# Patient Record
Sex: MALE | Race: AMERICAN INDIAN OR ALASKA NATIVE
[De-identification: names, ages, dates, MRNs, and addresses within clinical notes are randomized per-mention and may not be internally consistent; named-entity substitution may affect disease eponyms.]

---

## 2018-07-20 ENCOUNTER — HOSPITAL ENCOUNTER (EMERGENCY)
Dept: HOSPITAL 5 - ED | Age: 33
Discharge: HOME | End: 2018-07-20
Payer: COMMERCIAL

## 2018-07-20 VITALS — SYSTOLIC BLOOD PRESSURE: 134 MMHG | DIASTOLIC BLOOD PRESSURE: 88 MMHG
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
## 2018-07-20 DIAGNOSIS — R07.81: ICD-10-CM
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
## 2018-07-20 DIAGNOSIS — M25.512: ICD-10-CM

## 2018-07-20 DIAGNOSIS — M25.562: ICD-10-CM

## 2018-07-20 DIAGNOSIS — S70.312A: Primary | ICD-10-CM

## 2018-07-20 DIAGNOSIS — I10: ICD-10-CM

## 2018-07-20 NOTE — XRAY REPORT
FINAL REPORT



EXAM:  XR RIBS UNILAT 2V LT



HISTORY:  L side chest pain, fall 



TECHNIQUE:   Frontal and oblique views of the left ribs



PRIORS:  None.



FINDINGS:  

There is no evidence of acute displaced rib fracture. The ribs

are partly obscured by superimposed anatomy.



There is no pneumothorax, pulmonary consolidation to suggest

contusion, or pleural effusion in the visualized chest. 



IMPRESSION:  

No visible acute displaced rib fracture

## 2018-07-20 NOTE — XRAY REPORT
FINAL REPORT



EXAM:  XR TIBIA FIBULA 2V LT



HISTORY:  fall, swelling, bruising 



TECHNIQUE:  2 views of the left tibia and fibula



PRIORS:  Left knee 7/20/2018



FINDINGS:  

Nonspecific soft tissue ossification of the distal interosseous

membrane between the tibia and fibula. This may be developmental

variation or reflect old trauma. There is no radiographic

evidence of definite acute fracture or dislocation.  No evidence

of osseous lesion.  Joint spaces are maintained.  There is no

evidence of significant degenerative arthrosis.



IMPRESSION:  

No acute skeletal pathology

## 2018-07-20 NOTE — XRAY REPORT
FINAL REPORT



EXAM:  XR HUMERUS 2+V LT



HISTORY:  fall, swelling, bruising 



TECHNIQUE:  2 views of the left humerus



PRIORS:  None.



FINDINGS:  

There is no radiographic evidence of definite acute fracture or

dislocation.  No evidence of osseous lesion.  Joint spaces are

maintained.  There is no evidence of significant degenerative

arthrosis.



IMPRESSION:  

No acute skeletal pathology

## 2018-07-20 NOTE — XRAY REPORT
FINAL REPORT



EXAM:  XR KNEE 3V LT



HISTORY:  fall, bruising, swelling 



TECHNIQUE:  3 views of the left knee



PRIORS:  None.



FINDINGS:  

There is no radiographic evidence of definite acute fracture or

dislocation.  No definite misalignment.  No evidence of osseous

lesion.  Joint spaces are maintained.  No evidence of synovial

joint effusion.  There is no evidence of significant arthrosis. 



IMPRESSION:  

No acute skeletal pathology

## 2018-07-20 NOTE — EMERGENCY DEPARTMENT REPORT
HPI





- General


Chief Complaint: Fall


Time Seen by Provider: 07/20/18 15:09





- HPI


HPI: 








The patient is a 33-year-old male who presents for evaluation of left RIBS and 

lower shoulder pain at the traumatic fall.  The patient states that one day ago 

he fell through a hole in his roof and sustained trauma to the left leg and 

left RIBS.  He complains of moderate to severe throbbing pain to the inner left 

proximal leg and anterior lateral left lower ribs, worsened with movement of 

the legs or bending over of the thorax, improved with lying down and wrist.  He 

denies, injury to the head, headache, neck pain or stiffness, back pain, chest 

pain, dyspnea, abdominal pain, pain to the other extremities.








ED Past Medical Hx





- Past Medical History


Previous Medical History?: Yes


Hx Hypertension: Yes





- Surgical History


Past Surgical History?: Yes


Additional Surgical History: hernia repair





- Social History


Smoking Status: Never Smoker





- Medications


Home Medications: 


 Home Medications











 Medication  Instructions  Recorded  Confirmed  Last Taken  Type


 


Acetaminophen/Codeine [Tylenol #3] 1 tab PO Q6H PRN #14 tab 07/20/18  Unknown Rx


 


Ibuprofen [Motrin] 800 mg PO Q8HR PRN #15 tablet 07/20/18  Unknown Rx














ED Review of Systems


ROS: 


Stated complaint: (L) LEG PAIN/ RIB PAIN


Other details as noted in HPI








Constitutional: denies: fever


ENT: denies: throat or neck pain


Respiratory: denies: cough, shortness of breath


Cardiovascular: denies: chest pain


Endocrine: denies unexplained weight loss or gain


Gastrointestinal: denies: abdominal pain, nausea


Genitourinary: denies: dysuria


Musculoskeletal: reports leg pain and rib pain denies: leg swelling


Skin: denies: rash


Neurological: denies: headache


Hematological/Lymphatic: denies: easy bleeding or easy bruising  


Psych: denies sadness or hopelessness











Physical Exam





- Physical Exam


Vital Signs: 


 Vital Signs











  07/20/18





  14:43


 


Temperature 97.8 F


 


Pulse Rate 60


 


Respiratory 16





Rate 


 


Blood Pressure 134/88





[Left] 


 


O2 Sat by Pulse 100





Oximetry 











Physical Exam: 








General: well-nourished, well-developed, no acute distress


Head: Normocephalic, atraumatic


Eyes: normal sclera


ENT: Mucous membranes are pink and moist 


Neck: trachea midline, neck supple, No neck stiffness, no cervical adenopathy


Respiratory: Breath sounds equal bilaterally, no wheezing, rales, or rhonchi


Cardio: S1 and S2 present, no murmurs, rubs, gallops, capillary refill is brisk


Abdomen: Normoactive bowel sounds, soft abdomen, no rigidity, no tenderness in 

the abdomen whatsoever


Chest WALL/Back: No tenderness to palpation of the chest wall, left lateral 

lower rib tenderness present at the anterior axillary line, no bony crepitus 

present, no midline bony tenderness overlying the cervical, thoracic, lumbar 

spinous process, no spinous step-off or obvious deformity present, no flank 

tenderness or bruising


Musc: Large abrasion present to the left inner thigh, no active bleeding, leg 

compartments are soft and pliable bilaterally, no signs of compartment syndrome

, there is left anterior and posterior thigh tenderness to palpation, left knee 

tenderness to palpation, left anterior shin tenderness to palpation, sensation, 

motor function, and pulses in the distal left leg and tach, including patellar 

tendon extensor function, No pitting edema


Skin: No rash


Neuro: no facial drooping, normal speech


Psych: Normal affect














ED Course


 Vital Signs











  07/20/18





  14:43


 


Temperature 97.8 F


 


Pulse Rate 60


 


Respiratory 16





Rate 


 


Blood Pressure 134/88





[Left] 


 


O2 Sat by Pulse 100





Oximetry 














ED Medical Decision Making





- Medical Decision Making








The patient was seen and examined by myself.  The patient is placed on a 

cardiac monitor and continuous pulse ox. On initial evaluation, the patient was 

found to be in no distress. Evaluation orders were placed.  The patient was 

given pain medicine.  The patient declined tetanus immunization as she reports 

receiving tenderness shot within the past 5 years.  X-ray of the left ribs is 

negative for fracture or pneumothorax.  X-ray of the left femur, knee, and tib-

fib are unremarkable as well. The patient was reevaluated and reported that 

their symptoms were markedly improved.  The patient is stable for discharge 

with outpatient follow-up.  The patient is given follow-up and return 

instructions.  The patient expressed understanding and agreed with the plan.  

The patient is discharged in stable condition.





Critical care attestation.: 


If time is entered above; I have spent that time in minutes in the direct care 

of this critically ill patient, excluding procedure time.








ED Disposition


Clinical Impression: 


 Abrasion, left thigh, initial encounter, Acute pain of left thigh, Rib pain on 

left side





Left knee pain


Qualifiers:


 Chronicity: acute Qualified Code(s): M25.562 - Pain in left knee





Disposition: DC-01 TO HOME OR SELFCARE


Is pt being admited?: No


Does the pt Need Aspirin: No


Condition: Stable


Instructions:  Chest Pain (ED), Arthralgia (ED), Musculoskeletal Pain (ED)


Referrals: 


PRIMARY CARE,MD [Primary Care Provider] - 3-5 Days


[facility] [Outside] - 3-5 Days


Time of Disposition: 18:12

## 2019-03-11 ENCOUNTER — HOSPITAL ENCOUNTER (EMERGENCY)
Dept: HOSPITAL 5 - ED | Age: 34
LOS: 1 days | Discharge: LEFT BEFORE DISCHARGE | End: 2019-03-12
Payer: COMMERCIAL

## 2019-03-11 DIAGNOSIS — F17.200: ICD-10-CM

## 2019-03-11 DIAGNOSIS — R51: Primary | ICD-10-CM

## 2019-03-11 DIAGNOSIS — I10: ICD-10-CM
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
## 2019-03-11 DIAGNOSIS — R07.89: ICD-10-CM

## 2019-03-11 DIAGNOSIS — E66.9: ICD-10-CM
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
## 2019-03-11 LAB
ALBUMIN SERPL-MCNC: 3.9 G/DL (ref 3.9–5)
ALT SERPL-CCNC: 35 UNITS/L (ref 7–56)
BASOPHILS # (AUTO): 0.1 K/MM3 (ref 0–0.1)
BASOPHILS NFR BLD AUTO: 0.9 % (ref 0–1.8)
BILIRUB DIRECT SERPL-MCNC: < 0.2 MG/DL (ref 0–0.2)
BUN SERPL-MCNC: 12 MG/DL (ref 9–20)
BUN/CREAT SERPL: 12 %
CALCIUM SERPL-MCNC: 8.8 MG/DL (ref 8.4–10.2)
EOSINOPHIL # BLD AUTO: 0.3 K/MM3 (ref 0–0.4)
EOSINOPHIL NFR BLD AUTO: 4.2 % (ref 0–4.3)
HCT VFR BLD CALC: 43.7 % (ref 35.5–45.6)
HEMOLYSIS INDEX: 71
HGB BLD-MCNC: 15.1 GM/DL (ref 11.8–15.2)
LYMPHOCYTES # BLD AUTO: 3.5 K/MM3 (ref 1.2–5.4)
LYMPHOCYTES NFR BLD AUTO: 48.9 % (ref 13.4–35)
MCHC RBC AUTO-ENTMCNC: 35 % (ref 32–34)
MCV RBC AUTO: 96 FL (ref 84–94)
MONOCYTES # (AUTO): 0.7 K/MM3 (ref 0–0.8)
MONOCYTES % (AUTO): 10.4 % (ref 0–7.3)
PLATELET # BLD: 260 K/MM3 (ref 140–440)
RBC # BLD AUTO: 4.58 M/MM3 (ref 3.65–5.03)

## 2019-03-11 PROCEDURE — 80076 HEPATIC FUNCTION PANEL: CPT

## 2019-03-11 PROCEDURE — 93005 ELECTROCARDIOGRAM TRACING: CPT

## 2019-03-11 PROCEDURE — 96374 THER/PROPH/DIAG INJ IV PUSH: CPT

## 2019-03-11 PROCEDURE — 80048 BASIC METABOLIC PNL TOTAL CA: CPT

## 2019-03-11 PROCEDURE — 71046 X-RAY EXAM CHEST 2 VIEWS: CPT

## 2019-03-11 PROCEDURE — 70450 CT HEAD/BRAIN W/O DYE: CPT

## 2019-03-11 PROCEDURE — 82550 ASSAY OF CK (CPK): CPT

## 2019-03-11 PROCEDURE — 85025 COMPLETE CBC W/AUTO DIFF WBC: CPT

## 2019-03-11 PROCEDURE — 99284 EMERGENCY DEPT VISIT MOD MDM: CPT

## 2019-03-11 PROCEDURE — 93010 ELECTROCARDIOGRAM REPORT: CPT

## 2019-03-11 PROCEDURE — 84484 ASSAY OF TROPONIN QUANT: CPT

## 2019-03-11 PROCEDURE — 82553 CREATINE MB FRACTION: CPT

## 2019-03-11 PROCEDURE — 36415 COLL VENOUS BLD VENIPUNCTURE: CPT

## 2019-03-11 NOTE — EMERGENCY DEPARTMENT REPORT
Blank Doc





- Documentation


Documentation: 





This is a 34-year-old male that present with electrical shock that happened ab

out 2 hours ago.








This initial assessment/diagnostic orders/clinical plan/treatment(s) is/are 

subject to change based on patient's health status, clinical progression and re-

assessment by fellow clinical providers in the ED.  Further treatment and workup

at subsequent clinical providers discretion.  Patient/guardians urged not to 

elope from the ED as their condition may be serious if not clinically assessed 

and managed.  Initial orders include:


1- Patient sent to MAIN ED for further evaluation and treatment


2- labs


3- EKG

## 2019-03-11 NOTE — EMERGENCY DEPARTMENT REPORT
ED Chest Pain HPI





- General


Chief Complaint: Chest Pain


Stated Complaint: SHOCKED BY WIRES @ WORK


Time Seen by Provider: 03/11/19 21:31


Source: patient


Mode of arrival: Ambulatory


Limitations: No Limitations





- History of Present Illness


Initial Comments: 





35 yo male with a past medical history of obesity and hypertension presents to 

the hospital complaining of chest pain and a headache after electric shock. 

Patient was cleaning at work and touched an exposed wire. The spatula in his 

right hand flew out of his hand and he was pushed back to the ground.  He denies

head injury or LOC.  He complains of bitemporal headache that has been constant 

8/10 intensity.  He complains of constant left-sided chest heaviness that is 

"lingering" but not as bad as his headache. No aggravating or alleviating 

factors reported.  He denies shortness of breath, nausea, vomiting, diaphoresis,

blurry vision, focal weakness, focal numbness, or neck pain.  Patient had a 

stress test greater 5 years ago.  Presents with elevated blood pressureis been 

compliant with his BP medication.  PMD: [place] [facility]


Severity scale (0 -10): 8





- Related Data


                                  Previous Rx's











 Medication  Instructions  Recorded  Last Taken  Type


 


Acetaminophen/Codeine [Tylenol #3] 1 tab PO Q6H PRN #14 tab 07/20/18 Unknown Rx


 


Ibuprofen [Motrin 800 MG tab] 800 mg PO Q8HR PRN #30 tablet 03/12/19 Unknown Rx











                                    Allergies











Allergy/AdvReac Type Severity Reaction Status Date / Time


 


No Known Allergies Allergy   Verified 03/11/19 21:32














Heart Score





- HEART Score


History: Slightly suspicious


EKG: Non-specific


Age: < 45


Risk factors: 1-2 risk factors


Troponin: < normal limit


HEART Score: 2





ED Review of Systems


ROS: 


Stated complaint: SHOCKED BY WIRES @ WORK


Other details as noted in HPI





Comment: All other systems reviewed and negative





ED Past Medical Hx





- Past Medical History


Hx Hypertension: Yes





- Surgical History


Additional Surgical History: hernia repair





- Social History


Smoking Status: Current Every Day Smoker


Substance Use Type: Alcohol, Marijuana





- Medications


Home Medications: 


                                Home Medications











 Medication  Instructions  Recorded  Confirmed  Last Taken  Type


 


Acetaminophen/Codeine [Tylenol #3] 1 tab PO Q6H PRN #14 tab 07/20/18  Unknown Rx


 


Ibuprofen [Motrin 800 MG tab] 800 mg PO Q8HR PRN #30 tablet 03/12/19  Unknown Rx














ED Physical Exam





- General


Limitations: No Limitations





- Other


Other exam information: 





General: No limitations, patient is alert in no acute distress


Head exam: Atraumatic, normocephalic


Eyes exam: Normal appearance, pupils equal reactive to light, extraocular 

movements intact


ENT: Moist mucous membrane


Neck exam: Normal inspection, full range of motion, no meningismus nontender


Respiratory exam: Clear to auscultation bilateral, no wheezes, rales, crackles


Cardiovascular: Normal rate and rhythm, normal heart sounds, chest wall 

nontender


Abdomen: Soft, nondistended, and  nontender, with normal bowel sounds, no 

rebound, or guarding


Extremity: Full range of motion normal inspection no deformity, nontender arms 

or legs on exam


Back: Normal Inspection, full range of motion, no tenderness


Neurologic: Alert, oriented x3, cranial nerves intact, no motor or sensory 

deficit


Psychiatric: normal affect, normal mood


Skin: Warm, dry, intact





ED Course


                                   Vital Signs











  03/11/19 03/11/19 03/11/19





  21:32 23:03 23:15


 


Temperature 98.7 F  


 


Pulse Rate 61 54 L 46 L


 


Respiratory 16 11 L 11 L





Rate   


 


Blood Pressure 166/102  134/82


 


O2 Sat by Pulse   100





Oximetry   














  03/11/19 03/11/19





  23:21 23:31


 


Temperature  


 


Pulse Rate  47 L


 


Respiratory 18 15





Rate  


 


Blood Pressure  134/82


 


O2 Sat by Pulse  100





Oximetry  














JOSÉ MANUEL score





- José Manuel Score


Age > 65: (0) No


Aspirin use within the Past 7 Days: (0) No


3 or more CAD Risk Factors: (0) No


2 or more Angina events in past 24 hrs: (0) No


Known CAD with more than 50% Stenosis: (0) No


Elevated Cardiac Markers: (0) No


ST Deviation Greater than 0.5mm: (0) No


JOSÉ MANUEL Score: 0





ED Medical Decision Making





- Lab Data


Result diagrams: 


                                 03/11/19 21:40





                                 03/11/19 21:40








                                   Lab Results











  03/11/19 03/11/19 03/11/19 Range/Units





  21:40 21:40 22:46 


 


WBC  7.1    (4.5-11.0)  K/mm3


 


RBC  4.58    (3.65-5.03)  M/mm3


 


Hgb  15.1    (11.8-15.2)  gm/dl


 


Hct  43.7    (35.5-45.6)  %


 


MCV  96 H    (84-94)  fl


 


MCH  33 H    (28-32)  pg


 


MCHC  35 H    (32-34)  %


 


RDW  13.0 L    (13.2-15.2)  %


 


Plt Count  260    (140-440)  K/mm3


 


Lymph % (Auto)  48.9 H    (13.4-35.0)  %


 


Mono % (Auto)  10.4 H    (0.0-7.3)  %


 


Eos % (Auto)  4.2    (0.0-4.3)  %


 


Baso % (Auto)  0.9    (0.0-1.8)  %


 


Lymph #  3.5    (1.2-5.4)  K/mm3


 


Mono #  0.7    (0.0-0.8)  K/mm3


 


Eos #  0.3    (0.0-0.4)  K/mm3


 


Baso #  0.1    (0.0-0.1)  K/mm3


 


Seg Neutrophils %  35.6 L    (40.0-70.0)  %


 


Seg Neutrophils #  2.5    (1.8-7.7)  K/mm3


 


Sodium   140   (137-145)  mmol/L


 


Potassium   4.1   (3.6-5.0)  mmol/L


 


Chloride   104.9   ()  mmol/L


 


Carbon Dioxide   24   (22-30)  mmol/L


 


Anion Gap   15   mmol/L


 


BUN   12   (9-20)  mg/dL


 


Creatinine   1.0   (0.8-1.5)  mg/dL


 


Estimated GFR   > 60   ml/min


 


BUN/Creatinine Ratio   12   %


 


Glucose   75   ()  mg/dL


 


Calcium   8.8   (8.4-10.2)  mg/dL


 


Total Bilirubin   0.70   (0.1-1.2)  mg/dL


 


Direct Bilirubin   < 0.2   (0-0.2)  mg/dL


 


Indirect Bilirubin   0.5   mg/dL


 


AST   33   (5-40)  units/L


 


ALT   35   (7-56)  units/L


 


Alkaline Phosphatase   40   ()  units/L


 


Total Creatine Kinase    526 H  ()  units/L


 


CK-MB (CK-2)    6.1 H  (0.0-4.0)  ng/mL


 


CK-MB (CK-2) Rel Index    1.1  (0-4)  


 


Troponin T   < 0.010   (0.00-0.029)  ng/mL


 


Total Protein   5.8 L   (6.3-8.2)  g/dL


 


Albumin   3.9   (3.9-5)  g/dL


 


Albumin/Globulin Ratio   2.1   %














- EKG Data


-: EKG Interpreted by Me (possible anteroseptal infarct)


EKG shows normal: sinus rhythm, axis (qrs -36), QRS complexes (qrsd 100), ST-T 

waves (no stemi)


Rate: bradycardia (47)





- Radiology Data


Radiology results: report reviewed





PROCEDURE: CT HEAD/BRAIN WO CON 





TECHNIQUE: Computerized tomography of the head was performed without contrast 

material. 





CT DOSE LENGTH PRODUCT: 920 mGycm 





HISTORY: ha after electric shock 





COMPARISONS: None . 





FINDINGS: 





Skull and scalp: Normal . 


Paranasal sinuses: Normal . 


Ventricles and subarachnoid spaces: Normal . 


Cerebrum: No evidence of hemorrhage, acute infarction or mass . 


Cerebellum and brainstem: No evidence of hemorrhage, acute infarction or mass . 


Vasculature: Normal . 


Other: None . 


ASPECTS: 10 





IMPRESSION: There is no evidence of an acute intracranial process. . 








PROCEDURE: XR CHEST ROUTINE 2V 





TECHNIQUE: PA and lateral chest radiographs were obtained. 





HISTORY: cp after electric shock 





COMPARISONS: None. 





FINDINGS: 





Heart: Normal. 


Mediastinum/Vessels: Normal. 


Lungs/Pleural space: Normal. 


Bony thorax: No acute osseous abnormality. 








IMPRESSION: Normal examination. 





- Medical Decision Making





plan to admit given cp and card risk factors


pt declined admission


s/o AMA


Pain improved after toradol





- Differential Diagnosis


MI, arrhythmia, rhabdo, ICH


Critical Care Time: No


Critical care attestation.: 


If time is entered above; I have spent that time in minutes in the direct care 

of this critically ill patient, excluding procedure time.








ED Disposition


Clinical Impression: 


 Electric shock, Chest pain, Headache, HTN (hypertension), Obesity





Disposition: DC-01 TO HOME OR SELFCARE


Is pt being admited?: No


Does the pt Need Aspirin: No


Condition: Stable


Instructions:  Chest Pain (ED), Hypertension (ED), Electrical Burns in Adults 

(ED), Acute Headache (ED)


Additional Instructions: 


Take the medication as prescribed.  Follow up with your doctor or the 

clinic/doctor provided.  Return if symptoms worsen as indicated by your 

discharge instructions.  Admission was recommended you are signing out AGAINST 

MEDICAL ADVICE


Prescriptions: 


Ibuprofen [Motrin 800 MG tab] 800 mg PO Q8HR PRN #30 tablet


 PRN Reason: Pain


Referrals: 


[facility], [facility] [Other] - 2-3 Days


Forms:  AMA Form


Time of Disposition: 00:50

## 2019-03-12 VITALS — SYSTOLIC BLOOD PRESSURE: 177 MMHG | DIASTOLIC BLOOD PRESSURE: 117 MMHG

## 2019-03-12 NOTE — CAT SCAN REPORT
PROCEDURE:  CT HEAD/BRAIN WO CON 

 

TECHNIQUE:  Computerized tomography of the head was performed without contrast material.  

 

CT DOSE LENGTH PRODUCT:  920  mGycm 

 

HISTORY:  ha after electric shock  

 

COMPARISONS:  None . 

 

FINDINGS: 

 

Skull and scalp:  Normal . 

Paranasal sinuses:  Normal . 

Ventricles and subarachnoid spaces:  Normal . 

Cerebrum:  No evidence of hemorrhage, acute infarction or mass . 

Cerebellum and brainstem:  No evidence of hemorrhage, acute infarction or mass . 

Vasculature:  Normal . 

Other:  None . 

ASPECTS: 10 

 

IMPRESSION:  There is no evidence of an acute intracranial process. . 

 

This document is electronically signed by Joy Lobo DO., March 12 2019 12:25:09 AM ET